# Patient Record
Sex: FEMALE | Race: WHITE | URBAN - METROPOLITAN AREA
[De-identification: names, ages, dates, MRNs, and addresses within clinical notes are randomized per-mention and may not be internally consistent; named-entity substitution may affect disease eponyms.]

---

## 2021-04-22 ENCOUNTER — PREPPED CHART (OUTPATIENT)
Dept: URBAN - METROPOLITAN AREA CLINIC 50 | Facility: CLINIC | Age: 76
End: 2021-04-22

## 2021-04-26 ENCOUNTER — NEW REFERRAL (OUTPATIENT)
Dept: URBAN - METROPOLITAN AREA CLINIC 52 | Facility: CLINIC | Age: 76
End: 2021-04-26

## 2021-04-26 DIAGNOSIS — H01.003: ICD-10-CM

## 2021-04-26 DIAGNOSIS — H00.12: ICD-10-CM

## 2021-04-26 DIAGNOSIS — H01.006: ICD-10-CM

## 2021-04-26 PROCEDURE — 92002 INTRM OPH EXAM NEW PATIENT: CPT

## 2021-04-26 RX ORDER — NEOMYCIN SULFATE, POLYMYXIN B SULFATE AND DEXAMETHASONE 3.5; 10000; 1 MG/G; [USP'U]/G; MG/G
1 OINTMENT OPHTHALMIC TWICE A DAY
Start: 2021-04-26 | End: 2021-05-03

## 2021-04-26 ASSESSMENT — TONOMETRY
OD_IOP_MMHG: 12
OS_IOP_MMHG: 14

## 2021-04-26 ASSESSMENT — KERATOMETRY
OD_K2POWER_DIOPTERS: 41.00
OS_K2POWER_DIOPTERS: 41.50
OS_K1POWER_DIOPTERS: 42.00
OS_AXISANGLE_DEGREES: 126
OS_AXISANGLE2_DEGREES: 36
OD_AXISANGLE_DEGREES: 051
OD_K1POWER_DIOPTERS: 41.25
OD_AXISANGLE2_DEGREES: 141

## 2021-04-26 ASSESSMENT — VISUAL ACUITY
OD_CC: 20/40
OS_CC: 20/50
OU_CC: 20/40

## 2021-04-26 NOTE — PATIENT DISCUSSION
Discussed with patient,  recommend patient start maxitrol  ointment at bedtime in the left eye at bedtime for 1 week then stop.

## 2021-04-26 NOTE — PATIENT DISCUSSION
Discussed with patient that it is involving the lower puncta.  Recommend patient start maxitrol ointment in the right eye twice a day.

## 2021-04-26 NOTE — PATIENT DISCUSSION
Patient is a patient at [facility].  recommend patient keep her appt in may with them.